# Patient Record
Sex: FEMALE | Race: WHITE
[De-identification: names, ages, dates, MRNs, and addresses within clinical notes are randomized per-mention and may not be internally consistent; named-entity substitution may affect disease eponyms.]

---

## 2017-05-09 ENCOUNTER — HOSPITAL ENCOUNTER (OUTPATIENT)
Dept: HOSPITAL 47 - RADXRYALE | Age: 62
Discharge: HOME | End: 2017-05-09
Attending: PHYSICIAN ASSISTANT
Payer: COMMERCIAL

## 2017-05-09 DIAGNOSIS — M79.671: Primary | ICD-10-CM

## 2017-05-10 NOTE — XR
Right foot

 

HISTORY: Trauma and pain

 

3 views of the right foot

 

No comparisons

 

Mineralization, joint spaces and alignment are maintained. There is a plantar calcaneus spur. Degener
ative change present at the tarsometatarsal joints. Enthesophyte present at the insertion of the Achi
lles tendon. There is soft tissue swelling.

 

IMPRESSION: No fracture or dislocation, follow-up as indicated.

## 2019-01-14 ENCOUNTER — HOSPITAL ENCOUNTER (OUTPATIENT)
Dept: HOSPITAL 47 - RADMAMWWP | Age: 64
Discharge: HOME | End: 2019-01-14
Attending: FAMILY MEDICINE
Payer: COMMERCIAL

## 2019-01-14 DIAGNOSIS — Z12.31: Primary | ICD-10-CM

## 2019-01-14 PROCEDURE — 77067 SCR MAMMO BI INCL CAD: CPT

## 2019-01-19 NOTE — MM
Reason for exam: screening  (asymptomatic).

Last mammogram was performed 3 years and 1 month ago.



History:

Patient is postmenopausal.

Took hormonal contraceptives for 10 years.



MG Screening Mammo w CAD

Bilateral CC and MLO view(s) were taken.

Prior study comparison: December 17, 2015, bilateral MG screening mammo w CAD.

The breast tissue is heterogeneously dense. This may lower the sensitivity of 

mammography.

There are a few benign-appearing round linear bilateral breast calcifications.  No

discrete abnormality.





ASSESSMENT: Benign, BI-RAD 2



RECOMMENDATION:

Routine screening mammogram of both breasts in 1 year.

## 2022-06-29 ENCOUNTER — HOSPITAL ENCOUNTER (OUTPATIENT)
Dept: HOSPITAL 47 - RADXRYALE | Age: 67
End: 2022-06-29
Attending: PHYSICIAN ASSISTANT
Payer: COMMERCIAL

## 2022-06-29 DIAGNOSIS — M51.17: Primary | ICD-10-CM

## 2022-06-29 DIAGNOSIS — M47.26: ICD-10-CM

## 2022-06-29 DIAGNOSIS — M43.16: ICD-10-CM

## 2022-06-29 PROCEDURE — 72110 X-RAY EXAM L-2 SPINE 4/>VWS: CPT

## 2022-06-29 NOTE — XR
EXAM TYPE: LUMBAR SPINE X RAY SERIES

 

COMPARISON: NONE

 

HISTORY: Pain

 

TECHNIQUE: 4 views are submitted.

 

FINDINGS:

Alignment is anatomic.  The pedicles are intact.  The transverse processes are intact.  There is mult
ilevel degenerative disc disease with severe changes L5-S1 and multilevel facet arthropathy. Vascular
 calcifications are seen is a minimal anterior listhesis grade 1 L4 on L5.

 

IMPRESSION:

1. Diffuse osteopenia with multilevel moderate to severe degenerative disc disease most marked at L5-
S1. Multilevel severe facet arthropathy with suspected multilevel foraminal encroachment.

2. Grade 1 anterolisthesis L4 and L5.

## 2025-04-23 ENCOUNTER — HOSPITAL ENCOUNTER (OUTPATIENT)
Dept: HOSPITAL 47 - RADMRIMAIN | Age: 70
Discharge: HOME | End: 2025-04-23
Attending: FAMILY MEDICINE
Payer: MEDICARE

## 2025-04-23 DIAGNOSIS — M48.061: ICD-10-CM

## 2025-04-23 DIAGNOSIS — M51.26: ICD-10-CM

## 2025-04-23 DIAGNOSIS — M51.362: Primary | ICD-10-CM

## 2025-04-23 DIAGNOSIS — M47.816: ICD-10-CM

## 2025-04-23 DIAGNOSIS — M43.16: ICD-10-CM

## 2025-04-23 DIAGNOSIS — M99.73: ICD-10-CM

## 2025-04-23 PROCEDURE — 72148 MRI LUMBAR SPINE W/O DYE: CPT
